# Patient Record
Sex: MALE | Race: WHITE | Employment: FULL TIME | URBAN - METROPOLITAN AREA
[De-identification: names, ages, dates, MRNs, and addresses within clinical notes are randomized per-mention and may not be internally consistent; named-entity substitution may affect disease eponyms.]

---

## 2020-05-14 ENCOUNTER — OFFICE VISIT (OUTPATIENT)
Dept: PRIMARY CARE CLINIC | Age: 60
End: 2020-05-14

## 2020-05-14 VITALS — OXYGEN SATURATION: 97 % | TEMPERATURE: 99 F | HEART RATE: 74 BPM

## 2020-05-14 PROCEDURE — 99213 OFFICE O/P EST LOW 20 MIN: CPT | Performed by: INTERNAL MEDICINE

## 2020-05-15 LAB
SARS-COV-2: NOT DETECTED
SOURCE: NORMAL

## 2020-12-15 ENCOUNTER — TELEPHONE (OUTPATIENT)
Dept: ENDOCRINOLOGY | Age: 60
End: 2020-12-15

## 2020-12-15 NOTE — TELEPHONE ENCOUNTER
vm would not let you leave a message.     Tried calling pt to schedule a NP appt for endo from a referral we rec'd from Josseline GREEN

## 2021-09-22 ENCOUNTER — OFFICE VISIT (OUTPATIENT)
Dept: ENDOCRINOLOGY | Age: 61
End: 2021-09-22
Payer: COMMERCIAL

## 2021-09-22 VITALS
SYSTOLIC BLOOD PRESSURE: 143 MMHG | WEIGHT: 273 LBS | HEART RATE: 89 BPM | DIASTOLIC BLOOD PRESSURE: 91 MMHG | HEIGHT: 69 IN | RESPIRATION RATE: 16 BRPM | BODY MASS INDEX: 40.43 KG/M2

## 2021-09-22 DIAGNOSIS — I10 ESSENTIAL HYPERTENSION: ICD-10-CM

## 2021-09-22 DIAGNOSIS — E78.2 MIXED HYPERLIPIDEMIA: ICD-10-CM

## 2021-09-22 PROCEDURE — 99243 OFF/OP CNSLTJ NEW/EST LOW 30: CPT | Performed by: INTERNAL MEDICINE

## 2021-09-22 RX ORDER — CLOTRIMAZOLE AND BETAMETHASONE DIPROPIONATE 10; .64 MG/G; MG/G
CREAM TOPICAL
COMMUNITY
Start: 2021-06-23

## 2021-09-22 RX ORDER — DULAGLUTIDE 3 MG/.5ML
3 INJECTION, SOLUTION SUBCUTANEOUS WEEKLY
COMMUNITY
Start: 2021-08-30 | End: 2021-09-22

## 2021-09-22 RX ORDER — EMPAGLIFLOZIN AND METFORMIN HYDROCHLORIDE 12.5; 1 MG/1; MG/1
TABLET ORAL
Qty: 60 TABLET | Refills: 5 | Status: SHIPPED | OUTPATIENT
Start: 2021-09-22 | End: 2022-01-20 | Stop reason: SDUPTHER

## 2021-09-22 RX ORDER — SILDENAFIL 50 MG/1
TABLET, FILM COATED ORAL
COMMUNITY
Start: 2021-05-05

## 2021-09-22 RX ORDER — GABAPENTIN 300 MG/1
CAPSULE ORAL
COMMUNITY
Start: 2021-09-08

## 2021-09-22 RX ORDER — PRAVASTATIN SODIUM 20 MG
TABLET ORAL
COMMUNITY
Start: 2021-06-30 | End: 2022-01-20

## 2021-09-22 RX ORDER — DULAGLUTIDE 4.5 MG/.5ML
4.5 INJECTION, SOLUTION SUBCUTANEOUS WEEKLY
Qty: 4 PEN | Refills: 5 | Status: SHIPPED | OUTPATIENT
Start: 2021-09-22 | End: 2022-01-20 | Stop reason: SDUPTHER

## 2021-09-22 RX ORDER — LISINOPRIL 20 MG/1
TABLET ORAL
COMMUNITY
Start: 2021-06-30

## 2021-09-22 RX ORDER — NAPROXEN 500 MG/1
TABLET ORAL
COMMUNITY
Start: 2021-06-30

## 2021-09-22 NOTE — PROGRESS NOTES
Luann Yu is a 63 y.o. male is referred to me by Dr Darvin Garrett for evaluation and management of unocntrolled  Type 2 diabetes. Luann Yu was diagnosed with Diabetes mellitus in 2002 . At the time of diagnosis patient had polyuria polydipsia. Luann Yu got diabetic education in the past.  Comorbid conditions: neuropathy  Patient has diabetic polyneuropathy and is on gabapentin, he takes 300 mg 2 tablets 3 times a day  Patient started Trulicity in June 6619, the dose of Trulicity was uptitrated in July 20 21 to 3 mg he is also on 1000 mg twice daily of Metformin. Previously has tried glipizide. In Nov 2020 he had covid and was on steroids which drove the glucose up       INTERIM:    Diabetes  He presents for his initial diabetic visit. He has type 2 diabetes mellitus. No MedicAlert identification noted. There are no hypoglycemic associated symptoms. Associated symptoms include foot paresthesias. Pertinent negatives for diabetes include no weight loss. There are no hypoglycemic complications. Symptoms are stable. Diabetic complications include peripheral neuropathy. Risk factors for coronary artery disease include diabetes mellitus, dyslipidemia, hypertension, male sex and obesity. Current diabetic treatment includes oral agent (dual therapy). He is compliant with treatment most of the time. His breakfast blood glucose is taken between 7-8 am. His breakfast blood glucose range is generally 140-180 mg/dl. eats late at night sometimes   Now doing morning shift   Feels that the control should improve  Admits that diet is not the best  Did not lose any weight on Trulicity but notes improvement in symptoms        Past Medical History:   Diagnosis Date    Diabetes mellitus ([address]     Hypertension       There is no problem list on file for this patient. History reviewed. No pertinent surgical history.   Social History     Socioeconomic History    Marital status:      Spouse name: Not on file    Number of children: Not on file    Years of education: Not on file    Highest education level: Not on file   Occupational History    Not on file   Tobacco Use    Smoking status: Never Smoker    Smokeless tobacco: Never Used   Substance and Sexual Activity    Alcohol use: Never    Drug use: Never    Sexual activity: Not on file   Other Topics Concern    Not on file   Social History Narrative    Not on file     Social Determinants of Health     Financial Resource Strain:     Difficulty of Paying Living Expenses:    Food Insecurity:     Worried About Running Out of Food in the Last Year:     [address] in the Last Year:    Transportation Needs:     Lack of Transportation (Medical):      Lack of Transportation (Non-Medical):    Physical Activity:     Days of Exercise per Week:     Minutes of Exercise per Session:    Stress:     Feeling of Stress :    Social Connections:     Frequency of Communication with Friends and Family:     Frequency of Social Gatherings with Friends and Family:     Attends Sabianist Services:     Active Member of Clubs or Organizations:     Attends Club or Organization Meetings:     Marital Status:    Intimate Partner Violence:     Fear of Current or Ex-Partner:     Emotionally Abused:     Physically Abused:     Sexually Abused:      Family History   Problem Relation Age of Onset    Diabetes type 2  Father     Diabetes type 2  Paternal Grandmother      Current Outpatient Medications   Medication Sig Dispense Refill    clotrimazole-betamethasone (LOTRISONE) 1-0.05 % cream APPLY CREAM TOPICALLY TWICE DAILY TO FEET      Dulaglutide (TRULICITY) 3 MT/8.1HW SOPN Inject 3 mg into the skin once a week      gabapentin (NEURONTIN) 300 MG capsule TAKE 2 CAPSULES BY MOUTH THREE TIMES DAILY      lisinopril (PRINIVIL;ZESTRIL) 20 MG tablet lisinopril 20 mg tablet   TAKE 1 TABLET BY MOUTH ONCE DAILY      metFORMIN (GLUCOPHAGE) 1000 MG tablet metformin 1,000 mg tablet   TAKE 1 TABLET BY MOUTH TWICE DAILY      naproxen (NAPROSYN) 500 MG tablet TAKE 1 TABLET BY MOUTH TWICE DAILY WITH MEALS      pravastatin (PRAVACHOL) 20 MG tablet pravastatin 20 mg tablet   TAKE 1 TABLET BY MOUTH ONCE DAILY      sildenafil (VIAGRA) 50 MG tablet sildenafil 50 mg tablet   TAKE 1 TABLET BY MOUTH AS NEEDED FOR ERECTILE DYSFUNCTION       No current facility-administered medications for this visit. No Known Allergies  Family Status   Relation Name Status    Mother      Father      PGM  (Not Specified)       Review of Systems  I have reviewed the review of system questionnaire filled by the patient .   Patient was advised to contact PCP for non endocrine signs and symptoms       OBJECTIVE:  BP (!) 143/91   Pulse 89   Resp 16   Ht 5' 9\" (1.753 m)   Wt 273 lb (123.8 kg)   BMI 40.32 kg/m²    Wt Readings from Last 3 Encounters:   21 273 lb (123.8 kg)       Constitutional: no acute distress, well appearing and well nourished  Psychiatric: oriented to person, place and time, judgement and insight and normal, recent and remote memory and intact and mood and affect are normal  Skin: skin and subcutaneous tissue is normal without mass, normal turgor  Head and Face: examination of head and face revealed no abnormalities  Eyes: no lid or conjunctival swelling, erythema or discharge, pupils are normal  Ears/Nose: external inspection of ears and nose revealed no abnormalities, hearing is grossly normal  Oropharynx/Mouth/Face: lips, tongue and gums are normal with no lesions, the voice quality was normal  Neck: neck is supple and symmetric, with midline trachea and no masses, thyroid is normal  Lymphatics: normal cervical lymph nodes, normal supraclavicular nodes  Pulmonary: no increased work of breathing or signs of respiratory distress, lungs are clear to auscultation  Cardiovascular: normal heart rate and rhythm, normal S1 and S2, no murmurs   Abdomen: abdomen is soft, non-tender with no masses  Musculoskeletal: normal gait and station . Neurological: normal coordination and normal general cortical function      No results found for: LABA1C   Encounter Summary   Recent Data from [facility] to Hemoglobin A1C, B  Component 07/28/21 05/05/21 01/21/21 11/13/20 09/04/20 03/02/20   Hemoglobin A1C 10. 2High   9.8Abnormal   9.3High   8.8Abnormal  9.2Abnormal   9.4High     View all related data         ASSESSMENT/PLAN:      1. Uncontrolled type 2 diabetes mellitus with diabetic neuropathy, without long-term current use of insulin A1c 10.2 previously was 9.8 has been creeping up       I had a lengthy discussion with the patient about  his  uncontrolled diabetes. We discussed progression of diabetes in detail and also the incidence of complications associated with uncontrolled diabetes. Cleo De Los Santos was advised that lifestyle modification is the key to better control of his Diabetes. We discussed carbohydrate restriction in detail. Cleo De Los Santos was advised to check his  fingerstick glucose at least 1-2  times daily. On trulicity 3 mg weekly increase to  4.5 mg weekly   He is on metformin 1000 mg BID which will be stopped once he gets prescription for Synjardy  Switch to Synjardy 12.5 /1000 mg BID   Patient has uncontrolled diabetes on the current dose of Metformin and has previously tried sulfonylureas like glipizide in the past with no improvement in glucose. All possible Side effects were discussed in detail with patient in detail and patient was advised to call our office if he  notes any side effects hewas given are written handout detailing side effects from the medication.     Hypoglycemia protocol reviewed in detail with patient Patient was advised to carry glucose tablets   Continue to check his fingerstick glucose twice a day  Patient was advised that sending of his fingerstick blood glucose logs is crucial in management of his diabetes. I will adjust the  doses of diabetic medications  according to sent data. Health Maintenance       Last Eye Exam: advised to have annual dilated eye exam. his last eye exam was in 2021 he had cataract surgery  Last Podiatry Exam:  Last foot exam was podiatry at [facility]  Lipids: Last LDL level was 110 in July 2021  Microalbumin/Creatinine Ratio: was normal in July 2021    . Education: Reviewed ABCs of diabetes management (respective goals in parentheses): A1C (<7), blood pressure (<130/80), and cholesterol (LDL <100). - Comprehensive Metabolic Panel; Future  - Hemoglobin A1C; Future  - Lipid Panel; Future  - Microalbumin / Creatinine Urine Ratio; Future  - TSH without Reflex; Future    2. Essential hypertension  Blood pressure control was not adequate  He feels at home it always runs good    3. Mixed hyperlipidemia  On statins    Obesity  Advised to work on diet and caloric restriction along with carbohydrate restriction    Reviewed and/or ordered clinical lab results yes   Reviewed and/or ordered radiology tests Yes  Reviewed and/or ordered other diagnostic tests yes   Made a decision to obtain old records yes   Reviewed and summarized old records yes     Audi Mcguire was counseled regarding symptoms of current diagnosis, course and complications of disease if inadequately treated, side effects of medications, diagnosis, treatment options, and prognosis, risks, benefits, complications, and alternatives of treatment, labs, imaging and other studies and treatment targets and goals. He understands instructions and counseling      Return in about 3 months (around 12/22/2021). Please note that some or all of this report was generated using voice recognition software. Please notify me in case of any questions about the content of this document, as some errors in transcription may have occurred .

## 2021-09-22 NOTE — PATIENT INSTRUCTIONS
Patient Education        empagliflozin  Pronunciation:  EM pa gli FLOE zin  Brand:  Davian Vega  What is the most important information I should know about empagliflozin? You should not use empagliflozin if you have severe kidney disease, or if you are on dialysis. Taking empagliflozin can make you dehydrated, which could cause you to feel weak or dizzy (especially when you stand up). Empagliflozin can cause serious infections in the penis or vagina. Get medical help right away if you have burning, itching, odor, discharge, pain, tenderness, redness or swelling of the genital or rectal area, fever, or if you don't feel well. What is empagliflozin? Empagliflozin is used together with diet and exercise to improve blood sugar control in adults with type 2 diabetes mellitus. Empagliflozin is also used to lower the risk of death from heart attack, stroke, or heart failure in adults with type 2 diabetes who also have heart disease. Empagliflozin is not for treating type 1 diabetes. Empagliflozin may also be used for purposes not listed in this medication guide. What should I discuss with my healthcare provider before taking empagliflozin? You should not use empagliflozin if you are allergic to it, or if you have:  · severe kidney disease (or if you are on dialysis). Tell your doctor if you have ever had:  · liver or kidney disease;  · bladder infections or other urination problems;  · low blood pressure;  · heart problems;  · problems with your pancreas, including surgery;  · if you drink alcohol often; or  · if you are on a low salt diet. Follow your doctor's instructions about using this medicine if you are pregnant or you become pregnant. Controlling diabetes is very important during pregnancy, and having high blood sugar may cause complications in both the mother and the baby. You should not use empagliflozin during the second or third trimester of pregnancy.    You should not breastfeed while using this medicine. Empagliflozin is not approved for use by anyone younger than 25years old. How should I take empagliflozin? Follow all directions on your prescription label and read all medication guides or instruction sheets. Your doctor may occasionally change your dose. Use the medicine exactly as directed. You may take empagliflozin with or without food. Call your doctor if you are sick with vomiting or diarrhea, if you consume less food or fluid than usual, or if you are sweating more than usual.  Your blood sugar will need to be checked often, and you may also need to test the level of ketones your urine. Empagliflozin can cause life-threatening ketoacidosis (too much acid in the blood). Even if your blood sugar is normal, contact your doctor if a urine test shows that you have ketones in the urine. You may have low blood sugar (hypoglycemia) and feel very hungry, dizzy, irritable, confused, anxious, or shaky. To quickly treat hypoglycemia, eat or drink a fast-acting source of sugar (fruit juice, hard candy, crackers, raisins, or non-diet soda). Your doctor may prescribe a glucagon injection kit in case you have severe hypoglycemia. Be sure your family or close friends know how to give you this injection in an emergency. Also watch for signs of high blood sugar (hyperglycemia) such as increased thirst or urination. Blood sugar levels can be affected by stress, illness, surgery, exercise, alcohol use, or skipping meals. Ask your doctor before changing your dose or medication schedule. This medicine can affect the results of certain medical tests. Tell any doctor who treats you that you are using empagliflozin. Empagliflozin is only part of a treatment program that may also include diet, exercise, weight control, blood sugar testing, and special medical care. Follow your doctor's instructions very closely. Store at room temperature away from moisture and heat. What happens if I miss a dose?   Take the medicine as soon as you can, but skip the missed dose if it is almost time for your next dose. Do not take two doses at one time. What happens if I overdose? Seek emergency medical attention or call the Poison Help line at 1-974.991.5432. What should I avoid while taking empagliflozin? Avoid getting up too fast from a sitting or lying position, or you may feel dizzy. What are the possible side effects of empagliflozin? Get emergency medical help if you have signs of an allergic reaction: hives; difficult breathing; swelling of your face, lips, tongue, or throat. Seek medical attention right away if you have signs of a genital infection (penis or vagina): burning, itching, odor, discharge, pain, tenderness, redness or swelling of the genital or rectal area, fever, not feeling well. These symptoms may get worse quickly. Call your doctor at once if you have:  · little or no urination;  · dehydration symptoms --dizziness, weakness, feeling light-headed (like you might pass out);  · ketoacidosis (too much acid in the blood) --nausea, vomiting, stomach pain, confusion, unusual drowsiness, or trouble breathing; or  · signs of a bladder infection --pain or burning when you urinate, increased urination, blood in your urine, fever, pain in your pelvis or back. Older adults may be more likely to have side effects from this medicine. Common side effects may include:  · bladder infection; or  · vaginal yeast infection. This is not a complete list of side effects and others may occur. Call your doctor for medical advice about side effects. You may report side effects to FDA at 7-014-SVI-4887. What other drugs will affect empagliflozin? Tell your doctor about all your other medicines, especially:  · insulin or other oral diabetes medications; or  · a diuretic or \"water pill. \"  This list is not complete.  Other drugs may affect empagliflozin, including prescription and over-the-counter medicines, vitamins, and herbal products. Not all possible drug interactions are listed here. Where can I get more information? Your pharmacist can provide more information about empagliflozin. Remember, keep this and all other medicines out of the reach of children, never share your medicines with others, and use this medication only for the indication prescribed. Every effort has been made to ensure that the information provided by [address] is accurate, up-to-date, and complete, but no guarantee is made to that effect. Drug information contained herein may be time sensitive. [facility] information has been compiled for use by healthcare practitioners and consumers in the [place] and therefore [facility] does not warrant that uses outside of the [place] are appropriate, unless specifically indicated otherwise. [facility]'s drug information does not endorse drugs, diagnose patients or recommend therapy. [facility]'s drug information is an informational resource designed to assist licensed healthcare practitioners in caring for their patients and/or to serve consumers viewing this service as a supplement to, and not a substitute for, the expertise, skill, knowledge and judgment of healthcare practitioners. The absence of a warning for a given drug or drug combination in no way should be construed to indicate that the drug or drug combination is safe, effective or appropriate for any given patient. [facility] does not assume any responsibility for any aspect of healthcare administered with the aid of information [facility] provides. The information contained herein is not intended to cover all possible uses, directions, precautions, warnings, drug interactions, allergic reactions, or adverse effects. If you have questions about the drugs you are taking, check with your doctor, nurse or pharmacist.  Copyright 2223-1161 [address] [place] [address] Dr CLANCY. Version: 3.02. Revision date: 3/31/2020. Care instructions adapted under license by [facility] ([facility]). If you have questions about a medical condition or this instruction, always ask your healthcare professional. [place] [address] any warranty or liability for your use of this information.

## 2021-09-28 ENCOUNTER — TELEPHONE (OUTPATIENT)
Dept: ENDOCRINOLOGY | Age: 61
End: 2021-09-28

## 2021-11-05 ENCOUNTER — TELEPHONE (OUTPATIENT)
Dept: ENDOCRINOLOGY | Age: 61
End: 2021-11-05

## 2021-11-08 NOTE — TELEPHONE ENCOUNTER
Please advise patient glucose levels are still running high I would recommend watching his diet and reducing the amount of carbohydrates

## 2022-01-20 ENCOUNTER — OFFICE VISIT (OUTPATIENT)
Dept: ENDOCRINOLOGY | Age: 62
End: 2022-01-20
Payer: COMMERCIAL

## 2022-01-20 VITALS
HEART RATE: 72 BPM | RESPIRATION RATE: 16 BRPM | HEIGHT: 69 IN | SYSTOLIC BLOOD PRESSURE: 131 MMHG | DIASTOLIC BLOOD PRESSURE: 76 MMHG | WEIGHT: 271 LBS | BODY MASS INDEX: 40.14 KG/M2

## 2022-01-20 PROCEDURE — 99214 OFFICE O/P EST MOD 30 MIN: CPT | Performed by: INTERNAL MEDICINE

## 2022-01-20 RX ORDER — EMPAGLIFLOZIN AND METFORMIN HYDROCHLORIDE 12.5; 1 MG/1; MG/1
TABLET ORAL
Qty: 60 TABLET | Refills: 5 | Status: SHIPPED | OUTPATIENT
Start: 2022-01-20 | End: 2022-04-14

## 2022-01-20 RX ORDER — FLASH GLUCOSE SENSOR
KIT MISCELLANEOUS
Qty: 2 EACH | Refills: 5 | Status: SHIPPED | OUTPATIENT
Start: 2022-01-20

## 2022-01-20 RX ORDER — DULAGLUTIDE 4.5 MG/.5ML
4.5 INJECTION, SOLUTION SUBCUTANEOUS WEEKLY
Qty: 4 PEN | Refills: 5 | Status: SHIPPED | OUTPATIENT
Start: 2022-01-20 | End: 2022-03-08

## 2022-01-20 RX ORDER — PRAVASTATIN SODIUM 40 MG
40 TABLET ORAL DAILY
Qty: 90 TABLET | Refills: 1 | Status: SHIPPED | OUTPATIENT
Start: 2022-01-20 | End: 2022-07-26

## 2022-01-20 RX ORDER — FLASH GLUCOSE SCANNING READER
EACH MISCELLANEOUS
Qty: 1 EACH | Refills: 0 | Status: SHIPPED | OUTPATIENT
Start: 2022-01-20

## 2022-01-20 NOTE — PROGRESS NOTES
Kathia Hansen is a 57 y.o. male is seen for management of unocntrolled  Type 2 diabetes. Kathia Hansen was diagnosed with Diabetes mellitus in 2002 . At the time of diagnosis patient had polyuria polydipsia. Kathia Hansen got diabetic education in the past.  Comorbid conditions: neuropathy  Patient has diabetic polyneuropathy and is on gabapentin, he takes 300 mg 2 tablets 3 times a day  Patient started Trulicity in June 7881, the dose of Trulicity was uptitrated in July 20 21 to 3 mg he is also on 1000 mg twice daily of Metformin. Previously has tried glipizide. In Nov 2020 he had covid and was on steroids which drove the glucose up       INTERIM:    Diabetes  He presents for his follow-up diabetic visit. He has type 2 diabetes mellitus. No MedicAlert identification noted. There are no hypoglycemic associated symptoms. Associated symptoms include foot paresthesias. Pertinent negatives for diabetes include no weight loss. There are no hypoglycemic complications. Symptoms are stable. Diabetic complications include peripheral neuropathy. Risk factors for coronary artery disease include diabetes mellitus, dyslipidemia, hypertension, male sex and obesity. Current diabetic treatment includes oral agent (dual therapy). He is compliant with treatment most of the time. His breakfast blood glucose is taken between 7-8 am. His breakfast blood glucose range is generally 140-180 mg/dl. He has noted glucose values to have improved somewhat  Did not lose any weight on Trulicity but notes improvement in symptoms        Past Medical History:   Diagnosis Date    Diabetes mellitus ([address]     Hypertension       Patient Active Problem List   Diagnosis    Uncontrolled type 2 diabetes mellitus with diabetic neuropathy, without long-term current use of insulin ([address]    Essential hypertension    Mixed hyperlipidemia     History reviewed. No pertinent surgical history.   Social History     Socioeconomic History    Marital status:      Spouse name: Not on file    Number of children: Not on file    Years of education: Not on file    Highest education level: Not on file   Occupational History    Not on file   Tobacco Use    Smoking status: Never Smoker    Smokeless tobacco: Never Used   Substance and Sexual Activity    Alcohol use: Never    Drug use: Never    Sexual activity: Not on file   Other Topics Concern    Not on file   Social History Narrative    Not on file     Social Determinants of Health     Financial Resource Strain:     Difficulty of Paying Living Expenses: Not on file   Food Insecurity:     Worried About Running Out of Food in the Last Year: Not on file    Emanuel of Food in the Last Year: Not on file   Transportation Needs:     Lack of Transportation (Medical): Not on file    Lack of Transportation (Non-Medical):  Not on file   Physical Activity:     Days of Exercise per Week: Not on file    Minutes of Exercise per Session: Not on file   Stress:     Feeling of Stress : Not on file   Social Connections:     Frequency of Communication with Friends and Family: Not on file    Frequency of Social Gatherings with Friends and Family: Not on file    Attends Holiness Services: Not on file    Active Member of [address] [place] [address] [employer] or Organizations: Not on file    Attends Club or Organization Meetings: Not on file    Marital Status: Not on file   Intimate Partner Violence:     Fear of Current or Ex-Partner: Not on file    Emotionally Abused: Not on file    Physically Abused: Not on file    Sexually Abused: Not on file   Housing Stability:     Unable to Pay for Housing in the Last Year: Not on file    Number of [place] in the Last Year: Not on file    Unstable Housing in the Last Year: Not on file     Family History   Problem Relation Age of Onset    Diabetes type 2  Father     Diabetes type 2  Paternal Grandmother      Current Outpatient Medications   Medication Sig Dispense Refill  Empagliflozin-metFORMIN HCl (SYNJARDY) 12.5-1000 MG TABS Take 1 tab BID 60 tablet 5    Dulaglutide (TRULICITY) 4.5 LN/5.9VJ SOPN Inject 4.5 mg into the skin once a week 4 pen 5    pravastatin (PRAVACHOL) 40 MG tablet Take 1 tablet by mouth daily 90 tablet 1    Continuous Blood Gluc Sensor (FREESTYLE JEFF 2 SENSOR) MISC Change every 14 days 2 each 5    Continuous Blood Gluc  (FREESTYLE JEFF 2 READER) RODERICK To check glucose levels 1 each 0    clotrimazole-betamethasone (LOTRISONE) 1-0.05 % cream APPLY CREAM TOPICALLY TWICE DAILY TO FEET      gabapentin (NEURONTIN) 300 MG capsule TAKE 2 CAPSULES BY MOUTH THREE TIMES DAILY      lisinopril (PRINIVIL;ZESTRIL) 20 MG tablet lisinopril 20 mg tablet   TAKE 1 TABLET BY MOUTH ONCE DAILY      naproxen (NAPROSYN) 500 MG tablet TAKE 1 TABLET BY MOUTH TWICE DAILY WITH MEALS      sildenafil (VIAGRA) 50 MG tablet sildenafil 50 mg tablet   TAKE 1 TABLET BY MOUTH AS NEEDED FOR ERECTILE DYSFUNCTION       No current facility-administered medications for this visit. No Known Allergies  Family Status   Relation Name Status    Mother      Father      PGM  (Not Specified)       Review of Systems  I have reviewed the review of system questionnaire filled by the patient .   Patient was advised to contact PCP for non endocrine signs and symptoms       OBJECTIVE:  /76   Pulse 72   Resp 16   Ht 5' 9\" (1.753 m)   Wt 271 lb (122.9 kg)   BMI 40.02 kg/m²    Wt Readings from Last 3 Encounters:   22 271 lb (122.9 kg)   21 273 lb (123.8 kg)       Constitutional: no acute distress, well appearing and well nourished  Psychiatric: oriented to person, place and time, judgement and insight and normal, recent and remote memory and intact and mood and affect are normal  Skin: skin and subcutaneous tissue is normal without mass, normal turgor  Head and Face: examination of head and face revealed no abnormalities  Eyes: no lid or conjunctival swelling, erythema or discharge, pupils are normal  Ears/Nose: external inspection of ears and nose revealed no abnormalities, hearing is grossly normal  Oropharynx/Mouth/Face: lips, tongue and gums are normal with no lesions, the voice quality was normal  Neck: neck is supple and symmetric, with midline trachea and no masses, thyroid is normal  Lymphatics: normal cervical lymph nodes, normal supraclavicular nodes  Pulmonary: no increased work of breathing or signs of respiratory distress, lungs are clear to auscultation  Cardiovascular: normal heart rate and rhythm, normal S1 and S2, no murmurs   Musculoskeletal: normal gait and station . Neurological: normal coordination and normal general cortical function      No results found for: LABA1C   Encounter Summary   Recent Data from [facility] to Hemoglobin A1C, B  Component 07/28/21 05/05/21 01/21/21 11/13/20 09/04/20 03/02/20   Hemoglobin A1C 10. 2High   9.8Abnormal   9.3High   8.8Abnormal  9.2Abnormal   9.4High     View all related data         ASSESSMENT/PLAN:      1. Uncontrolled type 2 diabetes mellitus with diabetic neuropathy, without long-term current use of insulin A1c 10.2 previously was 9.8 has been creeping up    Aic improved 8.0  Congratulated him on improving diabetes control. At this stage I would recommend that he work on diet and exercise rather than adding any more medication. He does not want to start insulin although that was discussed previously  Sparkle Castorena was advised to check his  fingerstick glucose at least 1-2  times daily. On trulicity 4.5 mg weekly   On  Synjardy 12.5 /1000 mg BID     Hypoglycemia protocol reviewed in detail with patient Patient was advised to carry glucose tablets   Continue to check his fingerstick glucose twice a day  Patient was advised that sending of his fingerstick blood glucose logs is crucial in management of his  diabetes.  I will adjust the  doses of diabetic medications according to sent data. Health Maintenance       Last Eye Exam: advised to have annual dilated eye exam. his last eye exam was in 2021 he had cataract surgery  Last Podiatry Exam:  Last foot exam was [facility]  Lipids: Last LDL level was 110 in July 2021  Microalbumin/Creatinine Ratio: was normal in jan 2022   . Education: Reviewed ABCs of diabetes management (respective goals in parentheses): A1C (<7), blood pressure (<130/80), and cholesterol (LDL <100). 2. Essential hypertension  Blood pressure control was not adequate  He feels at home it always runs good    3. Mixed hyperlipidemia  On statins   in jan 2022   increase to 40 mg     Obesity  Advised to work on diet and caloric restriction along with carbohydrate restriction    Reviewed and/or ordered clinical lab results yes   Reviewed and/or ordered radiology tests Yes  Reviewed and/or ordered other diagnostic tests yes   Made a decision to obtain old records yes   Reviewed and summarized old records yes     Sanket Rush was counseled regarding symptoms of current diagnosis, course and complications of disease if inadequately treated, side effects of medications, diagnosis, treatment options, and prognosis, risks, benefits, complications, and alternatives of treatment, labs, imaging and other studies and treatment targets and goals. He understands instructions and counseling      I spent  30 + minutes which includes reviewing patient chart , interpreting previous lab results  , discussing and providing counseling and coordinating care of patient's multiple health issues with  the patient. Return in about 3 months (around 4/20/2022). Please note that some or all of this report was generated using voice recognition software. Please notify me in case of any questions about the content of this document, as some errors in transcription may have occurred .

## 2022-03-08 RX ORDER — DULAGLUTIDE 4.5 MG/.5ML
INJECTION, SOLUTION SUBCUTANEOUS
Qty: 4 PEN | Refills: 3 | Status: SHIPPED | OUTPATIENT
Start: 2022-03-08 | End: 2022-06-01 | Stop reason: SDUPTHER

## 2022-04-12 ENCOUNTER — TELEPHONE (OUTPATIENT)
Dept: ENDOCRINOLOGY | Age: 62
End: 2022-04-12

## 2022-04-14 RX ORDER — EMPAGLIFLOZIN AND METFORMIN HYDROCHLORIDE 12.5; 1 MG/1; MG/1
TABLET ORAL
Qty: 60 TABLET | Refills: 1 | Status: SHIPPED | OUTPATIENT
Start: 2022-04-14

## 2022-04-15 NOTE — TELEPHONE ENCOUNTER
Fax from [facility] [employer]?   w/ questionnaire to complete regarding pt's Prior Auth for [employer]

## 2022-06-01 ENCOUNTER — OFFICE VISIT (OUTPATIENT)
Dept: ENDOCRINOLOGY | Age: 62
End: 2022-06-01
Payer: COMMERCIAL

## 2022-06-01 VITALS
HEART RATE: 84 BPM | RESPIRATION RATE: 14 BRPM | BODY MASS INDEX: 39.69 KG/M2 | SYSTOLIC BLOOD PRESSURE: 137 MMHG | DIASTOLIC BLOOD PRESSURE: 76 MMHG | HEIGHT: 69 IN | WEIGHT: 268 LBS | TEMPERATURE: 98 F

## 2022-06-01 DIAGNOSIS — E04.9 GOITER: Primary | ICD-10-CM

## 2022-06-01 PROCEDURE — 99214 OFFICE O/P EST MOD 30 MIN: CPT | Performed by: INTERNAL MEDICINE

## 2022-06-01 RX ORDER — HYDROCHLOROTHIAZIDE 25 MG/1
25 TABLET ORAL EVERY OTHER DAY
COMMUNITY
Start: 2022-05-05 | End: 2022-08-03

## 2022-06-01 RX ORDER — DULAGLUTIDE 4.5 MG/.5ML
INJECTION, SOLUTION SUBCUTANEOUS
Qty: 4 PEN | Refills: 3 | Status: SHIPPED | OUTPATIENT
Start: 2022-06-01

## 2022-06-01 NOTE — PROGRESS NOTES
Saeid Tejada is a 57 y.o. male is seen for management of unocntrolled  Type 2 diabetes. Saeid Tejada was diagnosed with Diabetes mellitus in 2002 . At the time of diagnosis patient had polyuria polydipsia. Saeid Tejada got diabetic education in the past.  Comorbid conditions: neuropathy  Patient has diabetic polyneuropathy and is on gabapentin, he takes 300 mg 2 tablets 3 times a day  Patient started Trulicity in June 3613, the dose of Trulicity was uptitrated in July 20 21 to 3 mg he is also on 1000 mg twice daily of Metformin. Previously has tried glipizide. In Nov 2020 he had covid and was on steroids which drove the glucose up       INTERIM:    Diabetes  He presents for his follow-up diabetic visit. He has type 2 diabetes mellitus. No MedicAlert identification noted. There are no hypoglycemic associated symptoms. Associated symptoms include foot paresthesias. Pertinent negatives for diabetes include no weight loss. There are no hypoglycemic complications. Symptoms are stable. Diabetic complications include peripheral neuropathy. Risk factors for coronary artery disease include diabetes mellitus, dyslipidemia, hypertension, male sex and obesity. Current diabetic treatment includes oral agent (dual therapy). He is compliant with treatment most of the time. His breakfast blood glucose is taken between 7-8 am. His breakfast blood glucose range is generally 140-180 mg/dl. Did not lose any weight on Trulicity but notes improvement . Criss Yepez is not covered and is costing him more than $200 a month. Denies any hypoglycemia. Admits to dietary noncompliance        Past Medical History:   Diagnosis Date    Diabetes mellitus ([address]     Hypertension       Patient Active Problem List   Diagnosis    Uncontrolled type 2 diabetes mellitus with diabetic neuropathy, without long-term current use of insulin ([address]    Essential hypertension    Mixed hyperlipidemia     History reviewed.  No pertinent surgical history. Social History     Socioeconomic History    Marital status:      Spouse name: Not on file    Number of children: Not on file    Years of education: Not on file    Highest education level: Not on file   Occupational History    Not on file   Tobacco Use    Smoking status: Never Smoker    Smokeless tobacco: Never Used   Vaping Use    Vaping Use: Never used   Substance and Sexual Activity    Alcohol use: Never    Drug use: Never    Sexual activity: Not on file   Other Topics Concern    Not on file   Social History Narrative    Not on file     Social Determinants of Health     Financial Resource Strain:     Difficulty of Paying Living Expenses: Not on file   Food Insecurity:     Worried About [employer] in the Last Year: Not on file    Emanuel of Food in the Last Year: Not on file   Transportation Needs:     Lack of Transportation (Medical): Not on file    Lack of Transportation (Non-Medical):  Not on file   Physical Activity:     Days of Exercise per Week: Not on file    Minutes of Exercise per Session: Not on file   Stress:     Feeling of Stress : Not on file   Social Connections:     Frequency of Communication with Friends and Family: Not on file    Frequency of Social Gatherings with Friends and Family: Not on file    Attends Shinto Services: Not on file    Active Member of [address] [place] [address] [employer] assistant or Organizations: Not on file    Attends Club or Organization Meetings: Not on file    Marital Status: Not on file   Intimate Partner Violence:     Fear of Current or Ex-Partner: Not on file    Emotionally Abused: Not on file    Physically Abused: Not on file    Sexually Abused: Not on file   Housing Stability:     Unable to Pay for Housing in the Last Year: Not on file    Number of [place] in the Last Year: Not on file    Unstable Housing in the Last Year: Not on file     Family History   Problem Relation Age of Onset    Diabetes type 2  Father    Creston Sicard Diabetes type 2  Paternal Grandmother      Current Outpatient Medications   Medication Sig Dispense Refill    metFORMIN (GLUCOPHAGE) 1000 MG tablet Take 1,000 mg by mouth 2 times daily (with meals)      hydroCHLOROthiazide (HYDRODIURIL) 25 MG tablet Take 25 mg by mouth every other day       Dulaglutide (TRULICITY) 4.5 UI/3.4YV SOPN INJECT 1 SYRINGE SUBCUTANEOUSLY ONCE A WEEK 4 pen 3    Ertugliflozin L-PyroglutamicAc 15 MG TABS Take 1 tab daily 30 tablet 5    pravastatin (PRAVACHOL) 40 MG tablet Take 1 tablet by mouth daily 90 tablet 1    clotrimazole-betamethasone (LOTRISONE) 1-0.05 % cream APPLY CREAM TOPICALLY TWICE DAILY TO FEET      gabapentin (NEURONTIN) 300 MG capsule TAKE 2 CAPSULES BY MOUTH THREE TIMES DAILY      lisinopril (PRINIVIL;ZESTRIL) 20 MG tablet lisinopril 20 mg tablet   TAKE 1 TABLET BY MOUTH ONCE DAILY      naproxen (NAPROSYN) 500 MG tablet TAKE 1 TABLET BY MOUTH TWICE DAILY WITH MEALS      Empagliflozin-metFORMIN HCl (SYNJARDY) 12.5-1000 MG TABS Take 1 tablet by mouth twice daily (Patient not taking: Reported on 2022) 60 tablet 1    Continuous Blood Gluc Sensor (FREESTYLE JEFF 2 SENSOR) MISC Change every 14 days (Patient not taking: Reported on 2022) 2 each 5    Continuous Blood Gluc  (FREESTYLE JEFF 2 READER) RODERICK To check glucose levels (Patient not taking: Reported on 2022) 1 each 0    sildenafil (VIAGRA) 50 MG tablet sildenafil 50 mg tablet   TAKE 1 TABLET BY MOUTH AS NEEDED FOR ERECTILE DYSFUNCTION (Patient not taking: Reported on 2022)       No current facility-administered medications for this visit. No Known Allergies  Family Status   Relation Name Status    Mother      Father      PGM  (Not Specified)       Review of Systems  I have reviewed the review of system questionnaire filled by the patient .   Patient was advised to contact PCP for non endocrine signs and symptoms       OBJECTIVE:  /76   Pulse 84   Temp 98 °F (36.7 °C)   Resp 14    5' 9\" (1.753 m)   Wt 268 lb (121.6 kg)   BMI 39.58 kg/m²    Wt Readings from Last 3 Encounters:   06/01/22 268 lb (121.6 kg)   01/20/22 271 lb (122.9 kg)   09/22/21 273 lb (123.8 kg)       Constitutional: no acute distress, well appearing and well nourished  Psychiatric: oriented to person, place and time, judgement and insight and normal, recent and remote memory and intact and mood and affect are normal  Skin: skin and subcutaneous tissue is normal without mass, normal turgor  Head and Face: examination of head and face revealed no abnormalities  Eyes: no lid or conjunctival swelling, erythema or discharge, pupils are normal  Ears/Nose: external inspection of ears and nose revealed no abnormalities, hearing is grossly normal  Oropharynx/Mouth/Face: lips, tongue and gums are normal with no lesions, the voice quality was normal  Neck: neck is supple and symmetric, with midline trachea and no masses, thyroid is normal  Lymphatics: normal cervical lymph nodes, normal supraclavicular nodes  Pulmonary: no increased work of breathing or signs of respiratory distress, lungs are clear to auscultation  Cardiovascular: normal heart rate and rhythm, normal S1 and S2, no murmurs   Musculoskeletal: normal gait and station . Neurological: normal coordination and normal general cortical function      No results found for: LABA1C   Encounter Summary   Recent Data from [facility] to Hemoglobin A1C, B  Component 07/28/21 05/05/21 01/21/21 11/13/20 09/04/20 03/02/20   Hemoglobin A1C 10. 2High   9.8Abnormal   9.3High   8.8Abnormal  9.2Abnormal   9.4High     View all related data         ASSESSMENT/PLAN:      --- Uncontrolled type 2 diabetes mellitus with diabetic neuropathy, without long-term current use of insulin A1c 10.2 previously was 9.8 has been creeping up    Aic improved 7.2>>1.3    On trulicity 4.5 mg weekly   ---Metformin 1000 mg BID, previously tried Ramila and Neeraj but insurance did not cover it well so he has not taken it for 3 months  Start Steglatro if covered by insurance, prescription sent to the pharmacy  Previously jardiance was not covered   Hypoglycemia protocol reviewed in detail with patient Patient was advised to carry glucose tablets   Continue to check his fingerstick glucose twice a day  Patient was advised that sending of his fingerstick blood glucose logs is crucial in management of his  diabetes. I will adjust the  doses of diabetic medications  according to sent data. Health Maintenance       Last Eye Exam: advised to have annual dilated eye exam. his last eye exam was in 2022 -- he had cataract surgery  Last Podiatry Exam:  Last foot exam was podiatry at [address] [place] [address] in June 2022   Lipids: Last LDL level was 110 in July 2021  Microalbumin/Creatinine Ratio: was normal in jan 2022   . Education: Reviewed ABCs of diabetes management (respective goals in parentheses): A1C (<7), blood pressure (<130/80), and cholesterol (LDL <100). --- Essential hypertension  Blood pressure control was not adequate  He feels at home it always runs good    ----Mixed hyperlipidemia  On statins   in jan 2022 >>104   increase to 40 mg     ---Obesity  Advised to work on diet and caloric restriction along with carbohydrate restriction    Reviewed and/or ordered clinical lab results yes   Reviewed and/or ordered radiology tests Yes  Reviewed and/or ordered other diagnostic tests yes   Made a decision to obtain old records yes   Reviewed and summarized old records yes     Madhaviviji Samm was counseled regarding symptoms of current diagnosis, course and complications of disease if inadequately treated, side effects of medications, diagnosis, treatment options, and prognosis, risks, benefits, complications, and alternatives of treatment, labs, imaging and other studies and treatment targets and goals.   He understands instructions and counseling    Return in about 3 months (around 9/1/2022). Please note that some or all of this report was generated using voice recognition software. Please notify me in case of any questions about the content of this document, as some errors in transcription may have occurred .

## 2022-06-02 ENCOUNTER — TELEPHONE (OUTPATIENT)
Dept: ENDOCRINOLOGY | Age: 62
End: 2022-06-02

## 2022-06-02 NOTE — TELEPHONE ENCOUNTER
[facility] Key: [id number] - PA Case ID: [id number] - Rx #: [id number] STATUS:PENDING

## 2022-06-06 NOTE — TELEPHONE ENCOUNTER
I have switched the medication from [facility] to Jardiance 25 mg daily hopefully this should be covered as it stays on the PA letter

## 2022-07-25 DIAGNOSIS — E78.2 MIXED HYPERLIPIDEMIA: Primary | ICD-10-CM

## 2022-07-26 RX ORDER — PRAVASTATIN SODIUM 40 MG
TABLET ORAL
Qty: 90 TABLET | Refills: 0 | Status: SHIPPED | OUTPATIENT
Start: 2022-07-26 | End: 2022-10-14

## 2022-10-13 DIAGNOSIS — E78.2 MIXED HYPERLIPIDEMIA: ICD-10-CM

## 2022-10-14 RX ORDER — PRAVASTATIN SODIUM 40 MG
TABLET ORAL
Qty: 90 TABLET | Refills: 0 | Status: SHIPPED | OUTPATIENT
Start: 2022-10-14

## 2022-12-05 RX ORDER — EMPAGLIFLOZIN AND METFORMIN HYDROCHLORIDE 12.5; 1 MG/1; MG/1
TABLET ORAL
Qty: 60 TABLET | Refills: 0 | OUTPATIENT
Start: 2022-12-05

## 2022-12-07 ENCOUNTER — TELEPHONE (OUTPATIENT)
Dept: ENDOCRINOLOGY | Age: 62
End: 2022-12-07

## 2022-12-07 NOTE — TELEPHONE ENCOUNTER
Approved today  Request Reference Number: [id number].  TRULICITY INJ 3.9/0.2 is approved through 12/07/2023

## 2022-12-12 RX ORDER — EMPAGLIFLOZIN AND METFORMIN HYDROCHLORIDE 12.5; 1 MG/1; MG/1
TABLET ORAL
Qty: 60 TABLET | Refills: 0 | OUTPATIENT
Start: 2022-12-12

## 2022-12-13 ENCOUNTER — TELEPHONE (OUTPATIENT)
Dept: ENDOCRINOLOGY | Age: 62
End: 2022-12-13

## 2022-12-13 DIAGNOSIS — E11.9 TYPE 2 DIABETES MELLITUS WITHOUT COMPLICATION, UNSPECIFIED WHETHER LONG TERM INSULIN USE (HCC): ICD-10-CM

## 2022-12-13 NOTE — TELEPHONE ENCOUNTER
Pt requesting refill  Empagliflozin-metFORMIN HCl (SYNJARDY) 12.5-1000 MG TABS    He is not taking jardiance.       [facility], [address]   [address] [place] [address], [address] [place] [address],[address] [id number]   Phone:  [phone]  Fax:  [phone]

## 2022-12-14 RX ORDER — EMPAGLIFLOZIN AND METFORMIN HYDROCHLORIDE 12.5; 1 MG/1; MG/1
TABLET ORAL
Qty: 60 TABLET | Refills: 0 | Status: SHIPPED | OUTPATIENT
Start: 2022-12-14

## 2022-12-14 NOTE — TELEPHONE ENCOUNTER
Patient no showed his last visit and needs a follow up appointment scheduled. Attempted to call patient. No answer and no voicemail set up. Will send a 1 month refill but patient needs to schedule an appointment to continue getting refills.

## 2023-01-09 DIAGNOSIS — E78.2 MIXED HYPERLIPIDEMIA: ICD-10-CM

## 2023-01-10 RX ORDER — PRAVASTATIN SODIUM 40 MG
TABLET ORAL
Qty: 90 TABLET | Refills: 0 | OUTPATIENT
Start: 2023-01-10

## 2023-01-16 DIAGNOSIS — E11.9 TYPE 2 DIABETES MELLITUS WITHOUT COMPLICATION, UNSPECIFIED WHETHER LONG TERM INSULIN USE (HCC): ICD-10-CM

## 2023-01-16 DIAGNOSIS — E78.2 MIXED HYPERLIPIDEMIA: ICD-10-CM

## 2023-01-16 RX ORDER — EMPAGLIFLOZIN AND METFORMIN HYDROCHLORIDE 12.5; 1 MG/1; MG/1
TABLET ORAL
Qty: 60 TABLET | Refills: 0 | OUTPATIENT
Start: 2023-01-16

## 2023-01-16 RX ORDER — PRAVASTATIN SODIUM 40 MG
TABLET ORAL
Qty: 90 TABLET | Refills: 0 | OUTPATIENT
Start: 2023-01-16

## 2023-01-23 ENCOUNTER — TELEPHONE (OUTPATIENT)
Dept: ENDOCRINOLOGY | Age: 63
End: 2023-01-23

## 2023-01-23 DIAGNOSIS — E11.9 TYPE 2 DIABETES MELLITUS WITHOUT COMPLICATION, WITHOUT LONG-TERM CURRENT USE OF INSULIN (HCC): Primary | ICD-10-CM

## 2023-01-23 DIAGNOSIS — E11.9 TYPE 2 DIABETES MELLITUS WITHOUT COMPLICATION, UNSPECIFIED WHETHER LONG TERM INSULIN USE (HCC): ICD-10-CM

## 2023-01-23 DIAGNOSIS — E78.2 MIXED HYPERLIPIDEMIA: ICD-10-CM

## 2023-01-23 RX ORDER — PRAVASTATIN SODIUM 40 MG
TABLET ORAL
Qty: 90 TABLET | Refills: 0 | Status: SHIPPED | OUTPATIENT
Start: 2023-01-23

## 2023-01-23 RX ORDER — EMPAGLIFLOZIN AND METFORMIN HYDROCHLORIDE 12.5; 1 MG/1; MG/1
TABLET ORAL
Qty: 60 TABLET | Refills: 0 | Status: SHIPPED | OUTPATIENT
Start: 2023-01-23

## 2023-01-23 RX ORDER — DULAGLUTIDE 4.5 MG/.5ML
INJECTION, SOLUTION SUBCUTANEOUS
Qty: 2 ML | Refills: 0 | Status: SHIPPED | OUTPATIENT
Start: 2023-01-23

## 2023-01-23 NOTE — TELEPHONE ENCOUNTER
Pt calling for refill request for Synjardy, Trulicity 4.5, Pravastatin 40mg.  Send to [facility] in [address]   6-1-22  FOV   2-2-23

## 2023-02-02 ENCOUNTER — OFFICE VISIT (OUTPATIENT)
Dept: ENDOCRINOLOGY | Age: 63
End: 2023-02-02
Payer: COMMERCIAL

## 2023-02-02 VITALS
HEART RATE: 75 BPM | WEIGHT: 270 LBS | SYSTOLIC BLOOD PRESSURE: 149 MMHG | HEIGHT: 69 IN | BODY MASS INDEX: 39.99 KG/M2 | RESPIRATION RATE: 16 BRPM | DIASTOLIC BLOOD PRESSURE: 81 MMHG

## 2023-02-02 DIAGNOSIS — E78.2 MIXED HYPERLIPIDEMIA: ICD-10-CM

## 2023-02-02 DIAGNOSIS — I10 ESSENTIAL HYPERTENSION: ICD-10-CM

## 2023-02-02 DIAGNOSIS — E11.8 POORLY CONTROLLED TYPE 2 DIABETES MELLITUS WITH COMPLICATION (HCC): Primary | ICD-10-CM

## 2023-02-02 DIAGNOSIS — E11.9 TYPE 2 DIABETES MELLITUS WITHOUT COMPLICATION, UNSPECIFIED WHETHER LONG TERM INSULIN USE (HCC): ICD-10-CM

## 2023-02-02 DIAGNOSIS — E11.9 TYPE 2 DIABETES MELLITUS WITHOUT COMPLICATION, WITHOUT LONG-TERM CURRENT USE OF INSULIN (HCC): ICD-10-CM

## 2023-02-02 DIAGNOSIS — E11.65 POORLY CONTROLLED TYPE 2 DIABETES MELLITUS WITH COMPLICATION (HCC): Primary | ICD-10-CM

## 2023-02-02 PROCEDURE — 99214 OFFICE O/P EST MOD 30 MIN: CPT | Performed by: INTERNAL MEDICINE

## 2023-02-02 PROCEDURE — 3077F SYST BP >= 140 MM HG: CPT | Performed by: INTERNAL MEDICINE

## 2023-02-02 PROCEDURE — 3079F DIAST BP 80-89 MM HG: CPT | Performed by: INTERNAL MEDICINE

## 2023-02-02 RX ORDER — DULAGLUTIDE 4.5 MG/.5ML
INJECTION, SOLUTION SUBCUTANEOUS
Qty: 2 ML | Refills: 1 | Status: SHIPPED | OUTPATIENT
Start: 2023-02-02 | End: 2023-02-02 | Stop reason: SDUPTHER

## 2023-02-02 RX ORDER — DULAGLUTIDE 4.5 MG/.5ML
INJECTION, SOLUTION SUBCUTANEOUS
Qty: 2 ML | Refills: 5 | Status: SHIPPED | OUTPATIENT
Start: 2023-02-02

## 2023-02-02 RX ORDER — EMPAGLIFLOZIN AND METFORMIN HYDROCHLORIDE 12.5; 1 MG/1; MG/1
TABLET ORAL
Qty: 60 TABLET | Refills: 1 | Status: SHIPPED | OUTPATIENT
Start: 2023-02-02 | End: 2023-02-02 | Stop reason: SDUPTHER

## 2023-02-02 RX ORDER — EMPAGLIFLOZIN AND METFORMIN HYDROCHLORIDE 12.5; 1 MG/1; MG/1
TABLET ORAL
Qty: 60 TABLET | Refills: 5 | Status: SHIPPED | OUTPATIENT
Start: 2023-02-02

## 2023-02-02 RX ORDER — PRAVASTATIN SODIUM 40 MG
TABLET ORAL
Qty: 90 TABLET | Refills: 1 | Status: SHIPPED | OUTPATIENT
Start: 2023-02-02

## 2023-02-02 NOTE — PROGRESS NOTES
Lori Chavez is a 60 y.o. male is seen for management of unocntrolled  Type 2 diabetes. Lori Chavez was diagnosed with Diabetes mellitus in 2002 . At the time of diagnosis patient had polyuria polydipsia. Lori Chavez got diabetic education in the past.  Comorbid conditions: neuropathy  Patient has diabetic polyneuropathy and is on gabapentin, he takes 300 mg 2 tablets 3 times a day  Patient started Trulicity in June 6650, the dose of Trulicity was uptitrated in July 20 21 to 3 mg he is also on 1000 mg twice daily of Metformin. Previously has tried glipizide. In Nov 2020 he had covid and was on steroids which drove the glucose up       INTERIM:    Diabetes  He presents for his follow-up diabetic visit. He has type 2 diabetes mellitus. No MedicAlert identification noted. There are no hypoglycemic associated symptoms. Associated symptoms include foot paresthesias. Pertinent negatives for diabetes include no weight loss. There are no hypoglycemic complications. Symptoms are stable. Diabetic complications include peripheral neuropathy. Risk factors for coronary artery disease include diabetes mellitus, dyslipidemia, hypertension, male sex and obesity. Current diabetic treatment includes oral agent (dual therapy). He is compliant with treatment most of the time. His breakfast blood glucose is taken between 7-8 am. His breakfast blood glucose range is generally 140-180 mg/dl. Jardiance not covered  with previous insurance , trulicity was cost prohibtive as well   Now has better insurance   Denies any hypoglycemia. Admits to dietary noncompliance        Past Medical History:   Diagnosis Date    Diabetes mellitus ([address]     Hypertension       Patient Active Problem List   Diagnosis    Poorly controlled type 2 diabetes mellitus with complication ([facility]    Essential hypertension    Mixed hyperlipidemia     History reviewed. No pertinent surgical history.   Social History     Socioeconomic History    Marital status:      Spouse name: Not on file    Number of children: Not on file    Years of education: Not on file    Highest education level: Not on file   Occupational History    Not on file   Tobacco Use    Smoking status: Never    Smokeless tobacco: Never   Vaping Use    Vaping Use: Never used   Substance and Sexual Activity    Alcohol use: Never    Drug use: Never    Sexual activity: Not on file   Other Topics Concern    Not on file   Social History Narrative    Not on file     Social Determinants of Health     Financial Resource Strain: Not on file   Food Insecurity: Not on file   Transportation Needs: Not on file   Physical Activity: Not on file   Stress: Not on file   Social Connections: Not on file   Intimate Partner Violence: Not on file   Housing Stability: Not on file     Family History   Problem Relation Age of Onset    Diabetes type 2  Father     Diabetes type 2  Paternal Grandmother      Current Outpatient Medications   Medication Sig Dispense Refill    pravastatin (PRAVACHOL) 40 MG tablet Take 1 tablet by mouth once daily 90 tablet 1    Dulaglutide (TRULICITY) 4.5 HS/1.9DK SOPN INJECT 1 SYRINGE SUBCUTANEOUSLY ONCE A WEEK 2 mL 5    Empagliflozin-metFORMIN HCl (SYNJARDY) 12.5-1000 MG TABS Take 1 tablet by mouth twice daily 60 tablet 5    clotrimazole-betamethasone (LOTRISONE) 1-0.05 % cream APPLY CREAM TOPICALLY TWICE DAILY TO FEET      gabapentin (NEURONTIN) 300 MG capsule TAKE 2 CAPSULES BY MOUTH THREE TIMES DAILY      lisinopril (PRINIVIL;ZESTRIL) 20 MG tablet lisinopril 20 mg tablet   TAKE 1 TABLET BY MOUTH ONCE DAILY      naproxen (NAPROSYN) 500 MG tablet TAKE 1 TABLET BY MOUTH TWICE DAILY WITH MEALS      sildenafil (VIAGRA) 50 MG tablet sildenafil 50 mg tablet   TAKE 1 TABLET BY MOUTH AS NEEDED FOR ERECTILE DYSFUNCTION       No current facility-administered medications for this visit.      No Known Allergies  Family Status   Relation Name Status    Mother      Father     PGM  (Not Specified)       Review of Systems  I have reviewed the review of system questionnaire filled by the patient . Patient was advised to contact PCP for non endocrine signs and symptoms       OBJECTIVE:  BP (!) 149/81   Pulse 75   Resp 16   Ht 5' 9\" (1.753 m)   Wt 270 lb (122.5 kg)   BMI 39.87 kg/m²    Wt Readings from Last 3 Encounters:   23 270 lb (122.5 kg)   22 268 lb (121.6 kg)   22 271 lb (122.9 kg)       Constitutional: no acute distress, well appearing and well nourished  Psychiatric: oriented to person, place and time, judgement and insight and normal, recent and remote memory and intact and mood and affect are normal  Skin: skin and subcutaneous tissue is normal without mass, normal turgor  Head and Face: examination of head and face revealed no abnormalities  Eyes: no lid or conjunctival swelling, erythema or discharge, pupils are normal  Ears/Nose: external inspection of ears and nose revealed no abnormalities, hearing is grossly normal  Oropharynx/Mouth/Face: lips, tongue and gums are normal with no lesions, the voice quality was normal  Neck: neck is supple and symmetric, with midline trachea and no masses, thyroid is normal  Lymphatics: normal cervical lymph nodes, normal supraclavicular nodes  Pulmonary: no increased work of breathing or signs of respiratory distress, lungs are clear to auscultation  Cardiovascular: normal heart rate and rhythm, normal S1 and S2,   Neurological: normal coordination and normal general cortical function      No results found for: LABA1C   Encounter Summary   Recent Data from [facility]  Related to Hemoglobin A1C, B  Component 21   Hemoglobin A1C 10. 2High   9.8Abnormal   9.3High   8.8Abnormal  9.2Abnormal   9.4High     View all related data         ASSESSMENT/PLAN:      --- Uncontrolled type 2 diabetes mellitus with diabetic neuropathy, without long-term current use of insulin A1c 10.2 previously was 9.8 has been creeping up    Aic improved 8.0>>7.1>>8.4 in dec 2022   Worsened due to lack of medication. On trulicity 4.5 mg weekly he didn't had for 3 weeks and just got it filled   ---Metformin 1000 mg BID, previously tried [place] but insurance did not cover it well so he has not taken it for 3 months  Previously jardiance was not covered. Hypoglycemia protocol reviewed in detail with patient Patient was advised to carry glucose tablets   Continue to check his fingerstick glucose twice a day  Patient was advised that sending of his fingerstick blood glucose logs is crucial in management of his  diabetes. I will adjust the  doses of diabetic medications  according to sent data. Health Maintenance       Last Eye Exam: advised to have annual dilated eye exam. his last eye exam was in 2022 -- he had cataract surgery  Last Podiatry Exam:  Last foot exam was podiatry at [address] [place] [address] in June 2022   Lipids: Last LDL level was 110 in July 2021  Microalbumin/Creatinine Ratio: was normal in jan 2022   . Education: Reviewed ABCs of diabetes management (respective goals in parentheses): A1C (<7), blood pressure (<130/80), and cholesterol (LDL <100).     --- Essential hypertension  Blood pressure control was not adequate  He feels at home it always runs good    ----Mixed hyperlipidemia  On statins   in jan 2022 >>104 >>98  increase to 40 mg     ---Obesity  Advised to work on diet and caloric restriction along with carbohydrate restriction    Reviewed and/or ordered clinical lab results yes   Reviewed and/or ordered radiology tests Yes  Reviewed and/or ordered other diagnostic tests yes   Made a decision to obtain old records yes   Reviewed and summarized old records yes     Donovan Wallis was counseled regarding symptoms of current diagnosis, course and complications of disease if inadequately treated, side effects of medications, diagnosis, treatment options, and prognosis, risks, benefits, complications, and alternatives of treatment, labs, imaging and other studies and treatment targets and goals. He understands instructions and counseling  I spent  30 + minutes which includes reviewing patient chart , interpreting previous lab results  , discussing and providing counseling and coordinating care of patient's multiple health issues with  the patient. Return in about 3 months (around 5/2/2023). Please note that some or all of this report was generated using voice recognition software. Please notify me in case of any questions about the content of this document, as some errors in transcription may have occurred .

## 2023-03-07 ENCOUNTER — TELEPHONE (OUTPATIENT)
Dept: ENDOCRINOLOGY | Age: 63
End: 2023-03-07

## 2023-03-07 DIAGNOSIS — E11.8 POORLY CONTROLLED TYPE 2 DIABETES MELLITUS WITH COMPLICATION (HCC): Primary | ICD-10-CM

## 2023-03-07 DIAGNOSIS — E11.65 POORLY CONTROLLED TYPE 2 DIABETES MELLITUS WITH COMPLICATION (HCC): Primary | ICD-10-CM

## 2023-03-07 RX ORDER — BLOOD-GLUCOSE,RECEIVER,CONT
EACH MISCELLANEOUS
Qty: 1 EACH | Refills: 0 | Status: SHIPPED | OUTPATIENT
Start: 2023-03-07

## 2023-03-07 RX ORDER — BLOOD-GLUCOSE SENSOR
EACH MISCELLANEOUS
Qty: 9 EACH | Refills: 3 | Status: SHIPPED | OUTPATIENT
Start: 2023-03-07

## 2023-03-07 NOTE — TELEPHONE ENCOUNTER
Pt is calling and asking if he can try the Dexcom G 6? He has a friend that has the Dexcom.  [facility] [place] [facility] pharmacy info    # [phone]

## 2023-03-08 DIAGNOSIS — E11.8 POORLY CONTROLLED TYPE 2 DIABETES MELLITUS WITH COMPLICATION (HCC): Primary | ICD-10-CM

## 2023-03-08 DIAGNOSIS — E11.65 POORLY CONTROLLED TYPE 2 DIABETES MELLITUS WITH COMPLICATION (HCC): Primary | ICD-10-CM

## 2023-03-08 RX ORDER — BLOOD-GLUCOSE SENSOR
EACH MISCELLANEOUS
Qty: 3 EACH | Refills: 5 | Status: SHIPPED | OUTPATIENT
Start: 2023-03-08

## 2023-03-08 RX ORDER — BLOOD-GLUCOSE TRANSMITTER
EACH MISCELLANEOUS
Qty: 1 EACH | Refills: 3 | Status: SHIPPED | OUTPATIENT
Start: 2023-03-08

## 2023-03-08 RX ORDER — BLOOD-GLUCOSE,RECEIVER,CONT
EACH MISCELLANEOUS
Qty: 1 EACH | Refills: 0 | Status: SHIPPED | OUTPATIENT
Start: 2023-03-08

## 2023-03-08 NOTE — TELEPHONE ENCOUNTER
Faxes from [employer] G [employer] mis Transmit has been canceled      -The Prior Auth dept is currently reviewing your request

## 2023-03-08 NOTE — TELEPHONE ENCOUNTER
More faxes from [employer]    Dexcom G 6 sensor &   Has denied request , please follow appeals process

## 2023-03-09 NOTE — TELEPHONE ENCOUNTER
Please notate if PA is attempted so a duplicate isn't started. Will disregard this request now since it looks like the patient will need an appeal or to pay out of pocket.

## 2023-05-06 LAB
A/G RATIO: 1.1 (ref 1–2)
ALBUMIN SERPL-MCNC: 3.7 G/DL (ref 3.5–5.7)
ALBUMIN/PROTEIN TOTAL, SER/PL: 7 G/DL (ref 6–8.3)
ALP BLD-CCNC: 53 U/L (ref 34–104)
ALT SERPL-CCNC: 25 U/L (ref 7–52)
ANION GAP 4: 9 MMOL/L (ref 7–16)
AST W/O P-5'-P: 25 U/L (ref 13–39)
BILIRUB SERPL-MCNC: 0.6 MG/DL (ref 0.3–1)
BUN BLDV-MCNC: 23 MG/DL (ref 7–25)
CALCIUM SERPL-MCNC: 9.1 MG/DL (ref 8.6–10.2)
CHLORIDE BLD-SCNC: 108 MMOL/L (ref 98–107)
CHOLESTEROL, STONE: 149 MG/DL
CO2: 23 MMOL/L (ref 21–31)
CREAT SERPL-MCNC: 1.34 MG/DL (ref 0.7–1.3)
CREATININE URINE: 95.12 MG/DL
DEPRECATED MEAN BLOOD GLUCOSE: 143 MG/DL (ref 68–126)
EGFR MALE: 60 ML/MIN/1.73M2
GLOBULIN: 3.3 G/DL (ref 2.6–4.2)
GLUCOSE: 98 MG/DL (ref 74–109)
HBA1C MFR BLD: 6.6 % (ref 4–6)
HDLC SERPL-MCNC: 42 MG/DL (ref 40–60)
LDL CHOLESTEROL CALCULATED: 76 MG/DL (ref 0–99)
MICROALBUMIN UR-MCNC: < 7 MG/L
MICROALBUMIN/CREAT UR-RTO: NORMAL
POTASSIUM SERPL-SCNC: 4.6 MMOL/L (ref 3.5–5.1)
SODIUM BLD-SCNC: 140 MMOL/L (ref 136–145)
TRIGL SERPL-MCNC: 155 MG/DL
TSH ULTRASENSITIVE: 0.98 UIU/ML (ref 0.45–5.33)
VLDLC SERPL CALC-MCNC: 31 MG/DL (ref 0–40)

## 2023-05-12 ENCOUNTER — OFFICE VISIT (OUTPATIENT)
Dept: ENDOCRINOLOGY | Age: 63
End: 2023-05-12
Payer: COMMERCIAL

## 2023-05-12 VITALS
RESPIRATION RATE: 16 BRPM | HEART RATE: 77 BPM | WEIGHT: 257 LBS | BODY MASS INDEX: 38.06 KG/M2 | HEIGHT: 69 IN | SYSTOLIC BLOOD PRESSURE: 130 MMHG | DIASTOLIC BLOOD PRESSURE: 72 MMHG

## 2023-05-12 DIAGNOSIS — E11.9 TYPE 2 DIABETES MELLITUS WITHOUT COMPLICATION, WITHOUT LONG-TERM CURRENT USE OF INSULIN (HCC): ICD-10-CM

## 2023-05-12 DIAGNOSIS — E11.9 TYPE 2 DIABETES MELLITUS WITHOUT COMPLICATION, UNSPECIFIED WHETHER LONG TERM INSULIN USE (HCC): ICD-10-CM

## 2023-05-12 DIAGNOSIS — I10 ESSENTIAL HYPERTENSION: ICD-10-CM

## 2023-05-12 DIAGNOSIS — E78.2 MIXED HYPERLIPIDEMIA: Primary | ICD-10-CM

## 2023-05-12 PROCEDURE — 3044F HG A1C LEVEL LT 7.0%: CPT | Performed by: INTERNAL MEDICINE

## 2023-05-12 PROCEDURE — 3075F SYST BP GE 130 - 139MM HG: CPT | Performed by: INTERNAL MEDICINE

## 2023-05-12 PROCEDURE — 2022F DILAT RTA XM EVC RTNOPTHY: CPT | Performed by: INTERNAL MEDICINE

## 2023-05-12 PROCEDURE — 99213 OFFICE O/P EST LOW 20 MIN: CPT | Performed by: INTERNAL MEDICINE

## 2023-05-12 PROCEDURE — 1036F TOBACCO NON-USER: CPT | Performed by: INTERNAL MEDICINE

## 2023-05-12 PROCEDURE — 3017F COLORECTAL CA SCREEN DOC REV: CPT | Performed by: INTERNAL MEDICINE

## 2023-05-12 PROCEDURE — 3078F DIAST BP <80 MM HG: CPT | Performed by: INTERNAL MEDICINE

## 2023-05-12 PROCEDURE — G8427 DOCREV CUR MEDS BY ELIG CLIN: HCPCS | Performed by: INTERNAL MEDICINE

## 2023-05-12 PROCEDURE — G8417 CALC BMI ABV UP PARAM F/U: HCPCS | Performed by: INTERNAL MEDICINE

## 2023-05-12 RX ORDER — EMPAGLIFLOZIN AND METFORMIN HYDROCHLORIDE 12.5; 1 MG/1; MG/1
1 TABLET ORAL 2 TIMES DAILY
Qty: 180 TABLET | Refills: 1 | Status: SHIPPED | OUTPATIENT
Start: 2023-05-12

## 2023-05-12 RX ORDER — DULAGLUTIDE 4.5 MG/.5ML
INJECTION, SOLUTION SUBCUTANEOUS
Qty: 6 ML | Refills: 1 | Status: SHIPPED | OUTPATIENT
Start: 2023-05-12

## 2023-05-12 NOTE — PROGRESS NOTES
Huang Mcgraw is a 60 y.o. male is seen for management of unocntrolled  Type 2 diabetes. Huang Mcgraw was diagnosed with Diabetes mellitus in 2002 . At the time of diagnosis patient had polyuria polydipsia. Huang Mcgraw got diabetic education in the past.  Comorbid conditions: neuropathy  Patient has diabetic polyneuropathy and is on gabapentin, he takes 300 mg 2 tablets 3 times a day  Patient started Trulicity in June 3505, the dose of Trulicity was uptitrated in July 20 21 to 3 mg he is also on 1000 mg twice daily of Metformin. Previously has tried glipizide. In Nov 2020 he had covid and was on steroids which drove the glucose up       INTERIM:    Diabetes  He presents for his follow-up diabetic visit. He has type 2 diabetes mellitus. No MedicAlert identification noted. There are no hypoglycemic associated symptoms. Associated symptoms include foot paresthesias. Pertinent negatives for diabetes include no weight loss. There are no hypoglycemic complications. Symptoms are stable. Diabetic complications include peripheral neuropathy. Risk factors for coronary artery disease include diabetes mellitus, dyslipidemia, hypertension, male sex and obesity. Current diabetic treatment includes oral agent (dual therapy). He is compliant with treatment most of the time. His breakfast blood glucose is taken between 7-8 am. His breakfast blood glucose range is generally 140-180 mg/dl. Has been taking trulicity 4.5 mg weekly   Synjardy BID   Denies any hypoglycemia. Admits to dietary noncompliance        Past Medical History:   Diagnosis Date    Diabetes mellitus ([address]     Hypertension       Patient Active Problem List   Diagnosis    Poorly controlled type 2 diabetes mellitus with complication ([address]    Essential hypertension    Mixed hyperlipidemia     History reviewed. No pertinent surgical history.   Social History     Socioeconomic History    Marital status:      Spouse name: Not on

## 2023-11-20 DIAGNOSIS — E11.9 TYPE 2 DIABETES MELLITUS WITHOUT COMPLICATION, UNSPECIFIED WHETHER LONG TERM INSULIN USE (HCC): ICD-10-CM

## 2023-11-20 RX ORDER — EMPAGLIFLOZIN AND METFORMIN HYDROCHLORIDE 12.5; 1 MG/1; MG/1
1 TABLET ORAL 2 TIMES DAILY
Qty: 180 TABLET | Refills: 0 | Status: SHIPPED | OUTPATIENT
Start: 2023-11-20 | End: 2024-01-11 | Stop reason: SDUPTHER

## 2023-12-16 LAB
A/G RATIO: 1 (ref 1–2)
ALBUMIN SERPL-MCNC: 3.6 G/DL (ref 3.5–5.7)
ALBUMIN/PROTEIN TOTAL, SER/PL: 7.2 G/DL (ref 6–8.3)
ALP BLD-CCNC: 56 U/L (ref 34–104)
ALT SERPL-CCNC: 21 U/L (ref 7–52)
ANION GAP 4: 9 MMOL/L (ref 7–16)
AST W/O P-5'-P: 24 U/L (ref 13–39)
BILIRUB SERPL-MCNC: 0.5 MG/DL (ref 0.3–1)
BUN BLDV-MCNC: 21 MG/DL (ref 7–25)
CALCIUM SERPL-MCNC: 8.8 MG/DL (ref 8.6–10.2)
CHLORIDE BLD-SCNC: 107 MMOL/L (ref 98–107)
CHOLESTEROL, STONE: 154 MG/DL
CO2: 24 MMOL/L (ref 21–31)
CREAT SERPL-MCNC: 1.31 MG/DL (ref 0.7–1.3)
CREATININE URINE: 72.33 MG/DL
DEPRECATED MEAN BLOOD GLUCOSE: 146 MG/DL (ref 68–126)
ESTIMATED GLOMERULAR FILTRATION RATE CREATININE EQUATION: 61 ML/MIN/1.73M2
GLOBULIN: 3.6 G/DL (ref 2.6–4.2)
GLUCOSE: 112 MG/DL (ref 74–109)
HBA1C MFR BLD: 6.7 % (ref 4–6)
HDLC SERPL-MCNC: 39 MG/DL (ref 40–60)
LDL CHOLESTEROL CALCULATED: 93 MG/DL (ref 0–99)
MICROALBUMIN UR-MCNC: 7.6 MG/L
MICROALBUMIN/CREAT UR-RTO: 10.5 MG ALB/G CREAT
POTASSIUM SERPL-SCNC: 4.9 MMOL/L (ref 3.5–5.1)
SODIUM BLD-SCNC: 140 MMOL/L (ref 136–145)
TRIGL SERPL-MCNC: 111 MG/DL
TSH ULTRASENSITIVE: 0.99 UIU/ML (ref 0.45–5.33)
VLDLC SERPL CALC-MCNC: 22 MG/DL (ref 0–40)

## 2024-01-11 ENCOUNTER — OFFICE VISIT (OUTPATIENT)
Dept: ENDOCRINOLOGY | Age: 64
End: 2024-01-11
Payer: COMMERCIAL

## 2024-01-11 VITALS
BODY MASS INDEX: 38.95 KG/M2 | SYSTOLIC BLOOD PRESSURE: 130 MMHG | DIASTOLIC BLOOD PRESSURE: 75 MMHG | WEIGHT: 263 LBS | HEIGHT: 69 IN | HEART RATE: 68 BPM

## 2024-01-11 DIAGNOSIS — I10 ESSENTIAL HYPERTENSION: ICD-10-CM

## 2024-01-11 DIAGNOSIS — E11.9 TYPE 2 DIABETES MELLITUS WITHOUT COMPLICATION, UNSPECIFIED WHETHER LONG TERM INSULIN USE (HCC): Primary | ICD-10-CM

## 2024-01-11 DIAGNOSIS — E78.2 MIXED HYPERLIPIDEMIA: ICD-10-CM

## 2024-01-11 DIAGNOSIS — E11.9 TYPE 2 DIABETES MELLITUS WITHOUT COMPLICATION, WITHOUT LONG-TERM CURRENT USE OF INSULIN (HCC): ICD-10-CM

## 2024-01-11 PROCEDURE — 3046F HEMOGLOBIN A1C LEVEL >9.0%: CPT | Performed by: INTERNAL MEDICINE

## 2024-01-11 PROCEDURE — 3078F DIAST BP <80 MM HG: CPT | Performed by: INTERNAL MEDICINE

## 2024-01-11 PROCEDURE — G8417 CALC BMI ABV UP PARAM F/U: HCPCS | Performed by: INTERNAL MEDICINE

## 2024-01-11 PROCEDURE — 3075F SYST BP GE 130 - 139MM HG: CPT | Performed by: INTERNAL MEDICINE

## 2024-01-11 PROCEDURE — 3017F COLORECTAL CA SCREEN DOC REV: CPT | Performed by: INTERNAL MEDICINE

## 2024-01-11 PROCEDURE — 99214 OFFICE O/P EST MOD 30 MIN: CPT | Performed by: INTERNAL MEDICINE

## 2024-01-11 PROCEDURE — G8427 DOCREV CUR MEDS BY ELIG CLIN: HCPCS | Performed by: INTERNAL MEDICINE

## 2024-01-11 PROCEDURE — 2022F DILAT RTA XM EVC RTNOPTHY: CPT | Performed by: INTERNAL MEDICINE

## 2024-01-11 PROCEDURE — G8484 FLU IMMUNIZE NO ADMIN: HCPCS | Performed by: INTERNAL MEDICINE

## 2024-01-11 PROCEDURE — 1036F TOBACCO NON-USER: CPT | Performed by: INTERNAL MEDICINE

## 2024-01-11 RX ORDER — DULAGLUTIDE 4.5 MG/.5ML
INJECTION, SOLUTION SUBCUTANEOUS
Qty: 6 ML | Refills: 1 | Status: SHIPPED | OUTPATIENT
Start: 2024-01-11

## 2024-01-11 RX ORDER — EMPAGLIFLOZIN AND METFORMIN HYDROCHLORIDE 12.5; 1 MG/1; MG/1
1 TABLET ORAL 2 TIMES DAILY
Qty: 180 TABLET | Refills: 0 | Status: SHIPPED | OUTPATIENT
Start: 2024-01-11

## 2024-01-11 RX ORDER — PRAVASTATIN SODIUM 40 MG
40 TABLET ORAL DAILY
Qty: 90 TABLET | Refills: 0 | Status: SHIPPED | OUTPATIENT
Start: 2024-01-11

## 2024-01-11 NOTE — PROGRESS NOTES
Shashank Gomez is a 63 y.o. male is seen for management of unocntrolled  Type 2 diabetes. Shashank Gomez was diagnosed with Diabetes mellitus in 2002 .   At the time of diagnosis patient had polyuria polydipsia. Shashank Gomez got diabetic education in the past.  Comorbid conditions: neuropathy  Patient has diabetic polyneuropathy and is on gabapentin, he takes 300 mg 2 tablets 3 times a day  Patient started Trulicity in June 2021, the dose of Trulicity was uptitrated in July 20 21 to 3 mg he is also on 1000 mg twice daily of Metformin.  Previously has tried glipizide.  In Nov 2020 he had covid and was on steroids which drove the glucose up       INTERIM:    Diabetes  He presents for his follow-up diabetic visit. He has type 2 diabetes mellitus. No MedicAlert identification noted. There are no hypoglycemic associated symptoms. Associated symptoms include foot paresthesias. Pertinent negatives for diabetes include no weight loss. There are no hypoglycemic complications. Symptoms are stable. Diabetic complications include peripheral neuropathy. Risk factors for coronary artery disease include diabetes mellitus, dyslipidemia, hypertension, male sex and obesity. Current diabetic treatment includes oral agent (dual therapy). He is compliant with treatment most of the time. His breakfast blood glucose is taken between 7-8 am. His breakfast blood glucose range is generally 140-180 mg/dl.      Has been taking trulicity 4.5 mg weekly Synjardy BID   Denies any hypoglycemia          Past Medical History:   Diagnosis Date    Diabetes mellitus ([facility])     Hypertension       Patient Active Problem List   Diagnosis    Poorly controlled type 2 diabetes mellitus with complication ([facility])    Essential hypertension    Mixed hyperlipidemia     History reviewed. No pertinent surgical history.  Social History     Socioeconomic History    Marital status:      Spouse name: Not on file    Number of children: Not on file

## 2024-02-15 ENCOUNTER — OFFICE VISIT (OUTPATIENT)
Age: 64
End: 2024-02-15

## 2024-02-15 VITALS
TEMPERATURE: 98.2 F | BODY MASS INDEX: 38.93 KG/M2 | HEART RATE: 93 BPM | OXYGEN SATURATION: 95 % | SYSTOLIC BLOOD PRESSURE: 147 MMHG | DIASTOLIC BLOOD PRESSURE: 78 MMHG | WEIGHT: 263.6 LBS

## 2024-02-15 DIAGNOSIS — J40 BRONCHITIS: ICD-10-CM

## 2024-02-15 DIAGNOSIS — J01.40 ACUTE NON-RECURRENT PANSINUSITIS: Primary | ICD-10-CM

## 2024-02-15 RX ORDER — AMOXICILLIN 500 MG/1
500 CAPSULE ORAL 2 TIMES DAILY
Qty: 20 CAPSULE | Refills: 0 | Status: SHIPPED | OUTPATIENT
Start: 2024-02-15 | End: 2024-02-25

## 2024-02-15 RX ORDER — ALBUTEROL SULFATE 90 UG/1
2 AEROSOL, METERED RESPIRATORY (INHALATION) 4 TIMES DAILY PRN
Qty: 18 G | Refills: 0 | Status: SHIPPED | OUTPATIENT
Start: 2024-02-15

## 2024-02-15 NOTE — PROGRESS NOTES
Shashank Gomez (:  1960) is a 63 y.o. male,New patient, here for evaluation of the following chief complaint(s):  Cough (Fatigue, sore throat, wheezing when laying down x 1 week)      ASSESSMENT/PLAN:  Visit Diagnoses and Associated Orders       Acute non-recurrent pansinusitis    -  Primary    amoxicillin (AMOXIL) 500 MG capsule [451]           Bronchitis        amoxicillin (AMOXIL) 500 MG capsule [451]      albuterol sulfate HFA (VENTOLIN HFA) 108 (90 Base) MCG/ACT inhaler [13931]      Dextromethorphan-guaiFENesin  MG/5ML SYRP [386065]                   SUBJECTIVE/OBJECTIVE:  Rhonchi upper field noted. C/o congested Cough, Fatigue, sore throat, and wheezing when laying down         History provided by:  Patient    HPI:   63 y.o. male presents with symptoms of: sinus infection and bronchitis.         Vitals:    02/15/24 0931   BP: (!) 147/78   Site: Right Upper Arm   Position: Sitting   Cuff Size: Medium Adult   Pulse: 93   Temp: 98.2 °F (36.8 °C)   TempSrc: Temporal   SpO2: 95%   Weight: 119.6 kg (263 lb 9.6 oz)     Increase fluids (preferably with electrolytes) and rest.  Emergency follow up required for symptoms including, but not limited to, shortness of breath, chest pain, mental status change, fevers >101, difficulty or inability to swallow, dehydration, or if symptoms worsen.  See printed instructions given at discharge.     Blood pressure elevated at today's visit. Encouraged patient to follow up with PCP for re-evaluation of elevated BP.   Educated on 'red flags' on when to go to the ED in regards to blood pressure (headache, chest pain, shortness of breath, swelling of body parts, ringing of the ears, and dizziness).     Physical Exam  Constitutional:       General: He is not in acute distress.     Appearance: Normal appearance. He is well-developed.   HENT:      Right Ear: External ear normal. Tenderness present. Tympanic membrane is bulging.      Left Ear: External ear normal.

## 2024-02-15 NOTE — PATIENT INSTRUCTIONS
Discharge medications reviewed with the patient and appropriate educational materials and side effects teaching were provided.

## 2024-04-17 DIAGNOSIS — E78.2 MIXED HYPERLIPIDEMIA: ICD-10-CM

## 2024-04-17 RX ORDER — PRAVASTATIN SODIUM 40 MG
40 TABLET ORAL DAILY
Qty: 90 TABLET | Refills: 0 | Status: SHIPPED | OUTPATIENT
Start: 2024-04-17

## 2024-05-16 ENCOUNTER — TELEPHONE (OUTPATIENT)
Dept: ENDOCRINOLOGY | Age: 64
End: 2024-05-16

## 2024-05-16 DIAGNOSIS — E11.9 TYPE 2 DIABETES MELLITUS WITHOUT COMPLICATION, UNSPECIFIED WHETHER LONG TERM INSULIN USE (HCC): Primary | ICD-10-CM

## 2024-05-16 RX ORDER — SEMAGLUTIDE 2.68 MG/ML
INJECTION, SOLUTION SUBCUTANEOUS
Qty: 9 ML | Refills: 1 | Status: SHIPPED | OUTPATIENT
Start: 2024-05-16

## 2024-05-16 NOTE — TELEPHONE ENCOUNTER
Call from pt stating that the Trulicity 4.5 mg is on back order    Pt is wanting to know if Dr. Santo could send in an alternative

## 2024-05-21 DIAGNOSIS — E11.9 TYPE 2 DIABETES MELLITUS WITHOUT COMPLICATION, UNSPECIFIED WHETHER LONG TERM INSULIN USE (HCC): ICD-10-CM

## 2024-05-21 RX ORDER — EMPAGLIFLOZIN AND METFORMIN HYDROCHLORIDE 12.5; 1 MG/1; MG/1
1 TABLET ORAL 2 TIMES DAILY
Qty: 180 TABLET | Refills: 0 | Status: SHIPPED | OUTPATIENT
Start: 2024-05-21

## 2024-05-21 NOTE — TELEPHONE ENCOUNTER
Requested Prescriptions     Signed Prescriptions Disp Refills    SYNJARDY 12.5-1000 MG TABS 180 tablet 0     Sig: Take 1 tablet by mouth twice daily     Authorizing Provider: NICHOL DELGADO     Ordering User: [facility] [address] [place] [address] [phone] - F [phone]  [facility] [address]  Phone: [phone] Fax: [phone]

## 2024-08-17 DIAGNOSIS — E11.9 TYPE 2 DIABETES MELLITUS WITHOUT COMPLICATION, UNSPECIFIED WHETHER LONG TERM INSULIN USE (HCC): ICD-10-CM

## 2024-08-19 RX ORDER — EMPAGLIFLOZIN AND METFORMIN HYDROCHLORIDE 12.5; 1 MG/1; MG/1
1 TABLET ORAL 2 TIMES DAILY
Qty: 180 TABLET | Refills: 0 | OUTPATIENT
Start: 2024-08-19

## 2024-08-22 DIAGNOSIS — E11.9 TYPE 2 DIABETES MELLITUS WITHOUT COMPLICATION, UNSPECIFIED WHETHER LONG TERM INSULIN USE (HCC): ICD-10-CM

## 2024-08-22 RX ORDER — EMPAGLIFLOZIN AND METFORMIN HYDROCHLORIDE 12.5; 1 MG/1; MG/1
1 TABLET ORAL 2 TIMES DAILY
Qty: 180 TABLET | Refills: 0 | OUTPATIENT
Start: 2024-08-22